# Patient Record
Sex: MALE | Race: WHITE | NOT HISPANIC OR LATINO | Employment: FULL TIME | URBAN - METROPOLITAN AREA
[De-identification: names, ages, dates, MRNs, and addresses within clinical notes are randomized per-mention and may not be internally consistent; named-entity substitution may affect disease eponyms.]

---

## 2024-03-19 ENCOUNTER — OFFICE VISIT (OUTPATIENT)
Dept: ORTHOPEDIC SURGERY | Facility: CLINIC | Age: 69
End: 2024-03-19
Payer: MEDICARE

## 2024-03-19 ENCOUNTER — CLINICAL SUPPORT (OUTPATIENT)
Dept: ORTHOPEDIC SURGERY | Facility: CLINIC | Age: 69
End: 2024-03-19
Payer: MEDICARE

## 2024-03-19 DIAGNOSIS — M54.10 BACK PAIN WITH RADICULOPATHY: ICD-10-CM

## 2024-03-19 DIAGNOSIS — M54.10 BACK PAIN WITH RADICULOPATHY: Primary | ICD-10-CM

## 2024-03-19 PROCEDURE — 1159F MED LIST DOCD IN RCRD: CPT | Performed by: PHYSICIAN ASSISTANT

## 2024-03-19 PROCEDURE — 72100 X-RAY EXAM L-S SPINE 2/3 VWS: CPT | Performed by: ORTHOPAEDIC SURGERY

## 2024-03-19 PROCEDURE — 99204 OFFICE O/P NEW MOD 45 MIN: CPT | Performed by: PHYSICIAN ASSISTANT

## 2024-03-19 PROCEDURE — 1125F AMNT PAIN NOTED PAIN PRSNT: CPT | Performed by: PHYSICIAN ASSISTANT

## 2024-03-19 RX ORDER — TIZANIDINE 4 MG/1
4 TABLET ORAL EVERY 6 HOURS PRN
Qty: 30 TABLET | Refills: 0 | Status: SHIPPED | OUTPATIENT
Start: 2024-03-19 | End: 2024-03-29

## 2024-03-19 RX ORDER — METHYLPREDNISOLONE 4 MG/1
TABLET ORAL
Qty: 1 EACH | Refills: 0 | Status: SHIPPED | OUTPATIENT
Start: 2024-03-19

## 2024-03-19 ASSESSMENT — PAIN SCALES - GENERAL: PAINLEVEL_OUTOF10: 4

## 2024-03-19 ASSESSMENT — PAIN - FUNCTIONAL ASSESSMENT: PAIN_FUNCTIONAL_ASSESSMENT: 0-10

## 2024-03-19 NOTE — PROGRESS NOTES
The patient was new to the practice comes the office complaining of low back pain.  Goes down the right leg to the right hip all the way down to the ankle.  Back of the leg also.  Little bit through the groin started about the fall.  No trauma accident or fall to cause it.  Patient denies bowel or bladder complaints, saddle anesthesia gait or balance changes.  Denies any trauma accident or fall to cause the denies any spine surgeries in the past.  No treatment for this.    We looked at patient recent blood work showing a glucose of 94 sodium 143 potassium 4.5.  We also looked at primary doctors note check the medication list to see if he is on a statin medication to cause back pain and joint leg symptoms.  He has not    Physical exam: Well-nourished, well kept.  No lymphangitis or lymphadenopathy in the examined extremities.  Good perfusion to the extremities ×4.  Radial and dorsalis pedis pulses 2+.  Capillary refill to all 4 digits brisk.  No distal edema x 4.  Gait normal.  Can walk on heels and toes.  Examination of the neck reveals no swelling, step-off, or point tenderness.  Range of motion with flexion, extension, side bending and rotation is well maintained without crepitance, instability, or exacerbation of pain.  Strength is within normal limits.  Decreased range of motion flexion-extension rotation lumbar spine mildly tender good strength no instability.   examination of the upper extremities reveals no point tenderness, swelling, or deformity.  Range of motion of the shoulders, elbows, wrists, and fingers are all full without crepitance, instability, or exacerbation of pain.  Strength is 5/5 throughout.  Examination of the lower extremities reveals no point tenderness, swelling, or deformity.  Range of motion of the hips, knees, and ankles are full without crepitance, instability, or exacerbation of pain.  Strength is 5/5 throughout..  No redness, abrasions, or lesions on all 4 extremities, head and  neck, or trunk.  Gross sensation intact in the extremities ×4.  Deep tendon reflexes 2+ and symmetric bilaterally.  Julia, clonus, and Babinski were negative.  Straight leg raise negative.  Affect normal.  Alert and oriented ×3.  Coordination normal.    X-ray: X-ray lumbar spine AP lateral taken today and reviewed shows arthritic degenerative changes.  Very mild scoliosis patient collapses little bit to the right at the L4-5 level.  No fractures dislocations or bony lytic lesions.    Assessment: This a patient with low back pain radicular pain and encounter wants to treat this conservatively    Plan: Will get him into physical therapy.  At [facility].  Try some muscle relaxants and steroids if he is no better in about 6 weeks or so he can let me know and I will get an MRI of the lumbar spine for diagnostic purposes for maybe injections if needed

## 2024-07-18 ENCOUNTER — HOSPITAL ENCOUNTER (OUTPATIENT)
Dept: RADIOLOGY | Facility: CLINIC | Age: 69
Discharge: HOME | End: 2024-07-18
Payer: MEDICARE

## 2024-07-18 ENCOUNTER — OFFICE VISIT (OUTPATIENT)
Dept: ORTHOPEDIC SURGERY | Facility: CLINIC | Age: 69
End: 2024-07-18
Payer: MEDICARE

## 2024-07-18 DIAGNOSIS — M70.51 PATELLAR BURSITIS OF RIGHT KNEE: Primary | ICD-10-CM

## 2024-07-18 DIAGNOSIS — M25.561 ACUTE PAIN OF RIGHT KNEE: ICD-10-CM

## 2024-07-18 PROCEDURE — 73562 X-RAY EXAM OF KNEE 3: CPT | Mod: RT

## 2024-07-18 PROCEDURE — 99203 OFFICE O/P NEW LOW 30 MIN: CPT | Performed by: INTERNAL MEDICINE

## 2024-07-18 PROCEDURE — 99213 OFFICE O/P EST LOW 20 MIN: CPT | Performed by: INTERNAL MEDICINE

## 2024-07-18 RX ORDER — METHYLPREDNISOLONE 4 MG/1
TABLET ORAL
Qty: 1 EACH | Refills: 0 | Status: SHIPPED | OUTPATIENT
Start: 2024-07-18

## 2024-07-18 NOTE — PROGRESS NOTES
Acute Injury New Patient Visit    CC:   Chief Complaint   Patient presents with    Right Knee - Pain       HPI: Andre is a 68 y.o. male presents today for evaluation for right knee swelling with no pain which started about two days ago. He states that he fell and hit the his right knee into the concrete floor two weeks ago. He is here for initial evaluation and x-rays.  Denies any fevers or chills.        Review of Systems   GENERAL: Negative for malaise, significant weight loss, fever  MUSCULOSKELETAL: See HPI  NEURO:  Negative for numbness / tingling     Past Medical History  No past medical history on file.    Medication review  Medication Documentation Review Audit       Reviewed by Dimas Rogers (Technologist) on 03/19/24 at 0819      Medication Order Taking? Sig Documenting Provider Last Dose Status            No Medications to Display                                   Allergies  No Known Allergies    Social History  Social History     Socioeconomic History    Marital status: Single     Spouse name: Not on file    Number of children: Not on file    Years of education: Not on file    Highest education level: Not on file   Occupational History    Not on file   Tobacco Use    Smoking status: Some Days     Types: Cigars    Smokeless tobacco: Never   Substance and Sexual Activity    Alcohol use: Not on file    Drug use: Not on file    Sexual activity: Not on file   Other Topics Concern    Not on file   Social History Narrative    Not on file     Social Determinants of Health     Financial Resource Strain: Not on file   Food Insecurity: Not on file   Transportation Needs: Not on file   Physical Activity: Not on file   Stress: Not on file   Social Connections: Not on file   Intimate Partner Violence: Not on file   Housing Stability: Not on file       Surgical History  Past Surgical History:   Procedure Laterality Date    OTHER SURGICAL HISTORY  11/18/2019    Colonoscopy    OTHER SURGICAL HISTORY  11/18/2019     Finger surgical procedure       Physical Exam:  GENERAL:  Patient is awake, alert, and oriented to person place and time.  Patient appears well nourished and well kept.  Affect Calm, Not Acutely Distressed.  HEENT:  Normocephalic, Atraumatic, EOMI  CARDIOVASCULAR:  Hemodynamically stable.  RESPIRATORY:  Normal respirations with unlabored breathing.  Extremity: Right knee examination shows prepatellar bursal swelling, with no tenderness to palpate.  There is no erythema or warmth.  No effusion.  Can flex the right knee to 130 degrees.  Full extension at 0 degrees.  No pain over the medial joint line.  No pain over the lateral joint line.  There is no pain over the patellar or quadricep tendon.  No pain over the proximal tibia.  No pain over the popliteal fossa.  Negative valgus stress test.  Negative varus stress test.  Negative Jamaica's test medially with no instability.  Negative Jamaica's test laterally with no instability.  Negative Lachman's test.  Patellar and quadricep mechanism intact.  Negative anterior and posterior drawer test.  Negative patellar apprehension test.  Distal pulses are palpable, neurovascularly intact.  Walking with no significant antalgic gait.      Diagnostics: X-rays reviewed      Procedure: None     Assessment: Right knee prepatellar bursitis    Plan: Andre presents today for initial evaluation for acute right knee injury sustained after he fell abut two weeks ago. X-rays showed no obvious fractures. He sustained a right knee pre-patellar bursitis, with no clinical sign of infection. We recommended a compression sleeve with activity modification and Medrol dose [place] for inflammation. He will follow-up as needed.  Good prognosis.  Reassurance was given today to the patient.    Orders Placed This Encounter    XR knee right 3 views      At the conclusion of the visit there were no further questions by the patient/family regarding their plan of care.  Patient was instructed to call or  return with any issues, questions, or concerns regarding their injury and/or treatment plan described above.     07/18/24 at 2:21 PM - Maty Erwin MD  Scribe Attestation  By signing my name below, I, Finn Herrera, Scribe   attest that this documentation has been prepared under the direction and in the presence of Maty Erwin MD.    Office: [phone]    This note was prepared using voice recognition software.  The details of this note are correct and have been reviewed, and corrected to the best of my ability.  Some grammatical errors may persist related to the Dragon software.

## 2025-01-15 ENCOUNTER — OFFICE VISIT (OUTPATIENT)
Dept: ORTHOPEDIC SURGERY | Facility: CLINIC | Age: 70
End: 2025-01-15
Payer: MEDICARE

## 2025-01-15 ENCOUNTER — HOSPITAL ENCOUNTER (OUTPATIENT)
Dept: RADIOLOGY | Facility: CLINIC | Age: 70
Discharge: HOME | End: 2025-01-15
Payer: MEDICARE

## 2025-01-15 DIAGNOSIS — M25.571 ACUTE RIGHT ANKLE PAIN: ICD-10-CM

## 2025-01-15 DIAGNOSIS — M19.071 ARTHRITIS OF RIGHT ANKLE: ICD-10-CM

## 2025-01-15 PROCEDURE — 99214 OFFICE O/P EST MOD 30 MIN: CPT | Performed by: INTERNAL MEDICINE

## 2025-01-15 PROCEDURE — 99213 OFFICE O/P EST LOW 20 MIN: CPT | Performed by: INTERNAL MEDICINE

## 2025-01-15 PROCEDURE — L4361 PNEUMA/VAC WALK BOOT PRE OTS: HCPCS | Performed by: INTERNAL MEDICINE

## 2025-01-15 PROCEDURE — 73610 X-RAY EXAM OF ANKLE: CPT | Mod: RT

## 2025-01-15 PROCEDURE — 73630 X-RAY EXAM OF FOOT: CPT | Mod: RT

## 2025-01-15 RX ORDER — ACETAMINOPHEN AND CODEINE PHOSPHATE 300; 30 MG/1; MG/1
1 TABLET ORAL EVERY 6 HOURS PRN
Qty: 20 TABLET | Refills: 0 | Status: SHIPPED | OUTPATIENT
Start: 2025-01-15 | End: 2025-01-22

## 2025-01-15 RX ORDER — PREDNISONE 50 MG/1
50 TABLET ORAL DAILY
Qty: 10 TABLET | Refills: 0 | Status: SHIPPED | OUTPATIENT
Start: 2025-01-15 | End: 2025-01-25

## 2025-01-15 NOTE — PROGRESS NOTES
Acute Injury New Patient Visit    CC:   Chief Complaint   Patient presents with    Right Ankle - Pain       HPI: Andre is a 69 y.o. male presents today for evaluation for acute right ankle pain which started four days ago. No trauma or fall. He notes worsening right ankle pain. He denies any history of gout or previous injury. He is here for initial evaluation and x-rays.         Review of Systems   GENERAL: Negative for malaise, significant weight loss, fever  MUSCULOSKELETAL: See HPI  NEURO:  Negative for numbness / tingling     Past Medical History  No past medical history on file.    Medication review  Medication Documentation Review Audit       Reviewed by Dimas Rogers (Technologist) on 03/19/24 at 0819      Medication Order Taking? Sig Documenting Provider Last Dose Status            No Medications to Display                                   Allergies  No Known Allergies    Social History  Social History     Socioeconomic History    Marital status: Single     Spouse name: Not on file    Number of children: Not on file    Years of education: Not on file    Highest education level: Not on file   Occupational History    Not on file   Tobacco Use    Smoking status: Some Days     Types: Cigars    Smokeless tobacco: Never   Substance and Sexual Activity    Alcohol use: Not on file    Drug use: Not on file    Sexual activity: Not on file   Other Topics Concern    Not on file   Social History Narrative    Not on file     Social Drivers of Health     Financial Resource Strain: Not on file   Food Insecurity: Not on file   Transportation Needs: Not on file   Physical Activity: Not on file   Stress: Not on file   Social Connections: Not on file   Intimate Partner Violence: Not on file   Housing Stability: Not on file       Surgical History  Past Surgical History:   Procedure Laterality Date    OTHER SURGICAL HISTORY  11/18/2019    Colonoscopy    OTHER SURGICAL HISTORY  11/18/2019    Finger surgical procedure        Physical Exam:  GENERAL:  Patient is awake, alert, and oriented to person place and time.  Patient appears well nourished and well kept.  Affect Calm, Not Acutely Distressed.  HEENT:  Normocephalic, Atraumatic, EOMI  CARDIOVASCULAR:  Hemodynamically stable.  RESPIRATORY:  Normal respirations with unlabored breathing.  Extremity: Right ankle shows skin is intact.  Swelling localized over the right ankle.  There is no erythema or warmth.  There is no clinical signs of infection.  Pain over the tibiotalar joint.  There is no pain over the lateral malleolus.  There is no pain of the medial malleolus.  There is no pain over the ATF, CF or PTF ligament.  There is no pain over the deltoid ligament.  No pain over the Achilles tendon.  Negative Hagen's test.  Negative squeeze test.  Negative anterior drawer test.  Negative talar tilt test.  No pain over the anterior process of the talus.  There is no pain over the talar dome.  There is no pain at the base of the fifth metatarsal bone.  No pain of the calcaneus.  No pain over the plantar aponeurosis.  No pain of the midfoot.  Neurovascularly intact.    Right foot shows skin is intact.  No erythema warmth.  Mild pain over the first MTP joint.  Mild pain in the midfoot.  No pain at the base of the fifth metatarsal bone.  No pain of the calcaneus.  Distal pulses are palpable.    Diagnostics: X-rays reviewed  XR knee right 3 views  Interpreted By:  Maty Garcia,   STUDY:  XR KNEE RIGHT 3 VIEWS, 7/18/2024 2:20 pm      INDICATION:  Signs/Symptoms:PAIN      ACCESSION NUMBER(S):  [id number]      ORDERING CLINICIAN:  MATY GARCIA      FINDINGS:  Right knee x-rays three views AP, lateral and sunrise view: No acute  fractures, no dislocation. Mild degenerative changes. Slight  chondrocalcinosis of the medial and lateral compartment.          Signed by: Maty Garcia 7/18/2024 7:36 PM  Dictation workstation:   [id number]      Procedure: None    Assessment: Right ankle  arthrosis    Plan: Andre presents today for initial evaluation for acute right ankle pain which started four days ago secondary to aggravation of his right ankle arthrosis. No trauma or fall.  We recommended short course oral prednisone 50 mg for 5 days and a fracture boot for support, weight-bear as tolerated. He will follow-up in 2 weeks for reevaluation. If no improvement, we may consider further advanced imaging or possible corticosteroid injection.     Orders Placed This Encounter    XR ankle right 3+ views      At the conclusion of the visit there were no further questions by the patient/family regarding their plan of care.  Patient was instructed to call or return with any issues, questions, or concerns regarding their injury and/or treatment plan described above.     01/15/25 at 11:43 AM - Maty Erwin MD  Scribe Attestation  By signing my name below, I, Finn Herrera, Scribe   attest that this documentation has been prepared under the direction and in the presence of Maty Erwin MD.    Office: [phone]    This note was prepared using voice recognition software.  The details of this note are correct and have been reviewed, and corrected to the best of my ability.  Some grammatical errors may persist related to the Dragon software.

## 2025-01-30 ENCOUNTER — OFFICE VISIT (OUTPATIENT)
Dept: ORTHOPEDIC SURGERY | Facility: CLINIC | Age: 70
End: 2025-01-30
Payer: MEDICARE

## 2025-01-30 DIAGNOSIS — M19.071 ARTHRITIS OF RIGHT ANKLE: Primary | ICD-10-CM

## 2025-01-30 PROCEDURE — 1159F MED LIST DOCD IN RCRD: CPT | Performed by: INTERNAL MEDICINE

## 2025-01-30 PROCEDURE — 99214 OFFICE O/P EST MOD 30 MIN: CPT | Performed by: INTERNAL MEDICINE

## 2025-01-30 PROCEDURE — 99213 OFFICE O/P EST LOW 20 MIN: CPT | Performed by: INTERNAL MEDICINE

## 2025-01-30 RX ORDER — PREDNISONE 50 MG/1
50 TABLET ORAL DAILY
Qty: 5 TABLET | Refills: 0 | Status: SHIPPED | OUTPATIENT
Start: 2025-01-30 | End: 2025-02-04

## 2025-01-30 NOTE — PROGRESS NOTES
CC:   Chief Complaint   Patient presents with    Right Ankle - Follow-up     Arthrosis  Given prednisone       HPI: Andre is a 69 y.o. male presents today for reevaluation for right ankle arthrosis. He states that he had significant elief from the fracture boot and oral steroids, however he did not rest while on the medication and did a lot of working and walking on the ice.. He states that he is about 70% better.         Review of Systems   GENERAL: Negative for malaise, significant weight loss, fever  MUSCULOSKELETAL: See HPI  NEURO:  Negative for numbness / tingling     Past Medical History  History reviewed. No pertinent past medical history.    Medication review  Medication Documentation Review Audit       Reviewed by Rima Reese MA (Medical Assistant) on 25 at 0936      Medication Order Taking? Sig Documenting Provider Last Dose Status   methylPREDNISolone (Medrol Dospak) 4 mg tablets [id number]  Follow schedule on package instructions Jesse Frausto MD  Active   methylPREDNISolone (Medrol Dospak) 4 mg tablets [id number]  Follow schedule on package instructions Maty Erwin MD  Active   predniSONE (Deltasone) 50 mg tablet [id number]  Take 1 tablet (50 mg) by mouth once daily for 10 days. Maty Erwin MD   25 2359   tiZANidine (Zanaflex) 4 mg tablet [id number]  Take 1 tablet (4 mg) by mouth every 6 hours if needed for muscle spasms for up to 10 days. Jesse Frausto MD   24 2359                    Allergies  No Known Allergies    Social History  Social History     Socioeconomic History    Marital status: Single     Spouse name: Not on file    Number of children: Not on file    Years of education: Not on file    Highest education level: Not on file   Occupational History    Not on file   Tobacco Use    Smoking status: Some Days     Types: Cigars    Smokeless tobacco: Never   Substance and Sexual Activity    Alcohol use: Not on file    Drug use: Not on file     Sexual activity: Not on file   Other Topics Concern    Not on file   Social History Narrative    Not on file     Social Drivers of Health     Financial Resource Strain: Not on file   Food Insecurity: Not on file   Transportation Needs: Not on file   Physical Activity: Not on file   Stress: Not on file   Social Connections: Not on file   Intimate Partner Violence: Not on file   Housing Stability: Not on file       Surgical History  Past Surgical History:   Procedure Laterality Date    OTHER SURGICAL HISTORY  11/18/2019    Colonoscopy    OTHER SURGICAL HISTORY  11/18/2019    Finger surgical procedure       Physical Exam:  GENERAL:  Patient is awake, alert, and oriented to person place and time.  Patient appears well nourished and well kept.  Affect Calm, Not Acutely Distressed.  HEENT:  Normocephalic, Atraumatic, EOMI  CARDIOVASCULAR:  Hemodynamically stable.  RESPIRATORY:  Normal respirations with unlabored breathing.  Extremity: Right ankle shows skin is intact.  Resolved swelling localized over the right ankle.  There is no erythema or warmth.  There is no clinical signs of infection.  Minimal pain over the tibiotalar joint.  There is no pain over the lateral malleolus.  Pain over the peroneal tendon posterior to the lateral malleolus along its insertion.  There is no pain of the medial malleolus.  There is no pain over the ATF, CF or PTF ligament.  There is no pain over the deltoid ligament.  No pain over the Achilles tendon.  Negative Hagen's test.  Negative squeeze test.  Negative anterior drawer test.  Negative talar tilt test.  No pain over the anterior process of the talus.  There is no pain over the talar dome.  There is no pain at the base of the fifth metatarsal bone.  No pain of the calcaneus.  No pain over the plantar aponeurosis.  No pain of the midfoot.  Neurovascularly intact.       Diagnostics: None today  XR ankle right 3+ views  Interpreted By:  Maty Erwin,   STUDY:  XR ANKLE RIGHT 3+  VIEWS, 1/15/2025 11:54 am      INDICATION:  Signs/Symptoms:pain      ACCESSION NUMBER(S):  [id number]      ORDERING CLINICIAN:  MATY GARCIA      FINDINGS:  Right ankle x-rays three views AP, lateral and oblique view: No acute  fractures, no dislocation. Mortise intact. Moderate degenerative  changes of the tibiotalar joint.          Signed by: Maty Garcia 1/15/2025 5:33 PM  Dictation workstation:   [id number]  XR foot right 3+ views  Interpreted By:  Maty Garcia,   STUDY:  XR FOOT RIGHT 3+ VIEWS, 1/15/2025 11:54 am      INDICATION:  Signs/Symptoms:pain      ACCESSION NUMBER(S):  [id number]      ORDERING CLINICIAN:  MATY GARCIA      FINDINGS:  Right foot x-rays three views AP, lateral and oblique view: No acute  fractures, no dislocation. Advanced degenerative changes of the 1st  MTP joint. Degenerative changes of the talonavicular joint.          Signed by: Maty Garcia 1/15/2025 5:33 PM  Dictation workstation:   [id number]        Procedure: None    Assessment:  1.  Right ankle arthrosis  2.  Right peroneal tendinitis      Plan: Andre presents today for reevaluation for right ankle arthrosis and peroneal tendinitis. He is clinically getting better, some pain, from overuse. We recommended a lace up ankle brace, rest, physical therapy, and a short course of oral prednisone 50 mg for 5 days. He will follow-up in 4-5 weeks for reevaluation.  If no improvement we may consider further advanced imaging or custom orthotics.    No orders of the defined types were placed in this encounter.     At the conclusion of the visit there were no further questions by the patient/family regarding their plan of care.  Patient was instructed to call or return with any issues, questions, or concerns regarding their injury and/or treatment plan described above.     01/30/25 at 9:36 AM - Maty Garcia MD  Scribe Attestation  By signing my name below, IFinn Scribe   attest that this documentation has  been prepared under the direction and in the presence of Maty Erwin MD.    Office: [phone]    This note was prepared using voice recognition software.  The details of this note are correct and have been reviewed, and corrected to the best of my ability.  Some grammatical errors may persist related to the Dragon software.

## 2025-02-13 ENCOUNTER — OFFICE VISIT (OUTPATIENT)
Dept: ORTHOPEDIC SURGERY | Facility: CLINIC | Age: 70
End: 2025-02-13
Payer: MEDICARE

## 2025-02-13 ENCOUNTER — HOSPITAL ENCOUNTER (OUTPATIENT)
Dept: RADIOLOGY | Facility: CLINIC | Age: 70
Discharge: HOME | End: 2025-02-13
Payer: MEDICARE

## 2025-02-13 DIAGNOSIS — M19.071 ARTHROSIS OF ANKLE, RIGHT: Primary | ICD-10-CM

## 2025-02-13 DIAGNOSIS — M19.071 ARTHRITIS OF RIGHT ANKLE: ICD-10-CM

## 2025-02-13 PROCEDURE — 1159F MED LIST DOCD IN RCRD: CPT | Performed by: INTERNAL MEDICINE

## 2025-02-13 PROCEDURE — 73610 X-RAY EXAM OF ANKLE: CPT | Mod: RT

## 2025-02-13 PROCEDURE — 99213 OFFICE O/P EST LOW 20 MIN: CPT | Performed by: INTERNAL MEDICINE

## 2025-02-13 RX ORDER — ACETAMINOPHEN AND CODEINE PHOSPHATE 300; 30 MG/1; MG/1
1 TABLET ORAL EVERY 12 HOURS PRN
Qty: 14 TABLET | Refills: 0 | Status: SHIPPED | OUTPATIENT
Start: 2025-02-13 | End: 2025-02-20

## 2025-02-13 NOTE — PROGRESS NOTES
CC:   No chief complaint on file.      HPI: Andre is a 69 y.o. male presents today for reevaluation for right ankle arthrosis. He states that he has increased pain and swelling, he notes that he had been doing well, about 80% better after his initial oral steroids, until this recent flare up. He was evaluated at the urgent care today and was placed on oral steroids again. Repeat x-rays today.  Denies any fevers or chills.  No recent infection.        Review of Systems   GENERAL: Negative for malaise, significant weight loss, fever  MUSCULOSKELETAL: See HPI  NEURO:  Negative for numbness / tingling     Past Medical History  No past medical history on file.    Medication review  Medication Documentation Review Audit       Reviewed by Rima Reese MA (Medical Assistant) on 25 at 0936      Medication Order Taking? Sig Documenting Provider Last Dose Status   methylPREDNISolone (Medrol Dospak) 4 mg tablets [id number]  Follow schedule on package instructions Jesse Frausto MD  Active   methylPREDNISolone (Medrol Dospak) 4 mg tablets [id number]  Follow schedule on package instructions Maty Erwin MD  Active   predniSONE (Deltasone) 50 mg tablet [id number]  Take 1 tablet (50 mg) by mouth once daily for 10 days. Maty Erwin MD   25 2359   tiZANidine (Zanaflex) 4 mg tablet [id number]  Take 1 tablet (4 mg) by mouth every 6 hours if needed for muscle spasms for up to 10 days. Jesse Frausto MD   24 2359                    Allergies  No Known Allergies    Social History  Social History     Socioeconomic History    Marital status: Single     Spouse name: Not on file    Number of children: Not on file    Years of education: Not on file    Highest education level: Not on file   Occupational History    Not on file   Tobacco Use    Smoking status: Some Days     Types: Cigars    Smokeless tobacco: Never   Substance and Sexual Activity    Alcohol use: Not on file    Drug use: Not on  file    Sexual activity: Not on file   Other Topics Concern    Not on file   Social History Narrative    Not on file     Social Drivers of Health     Financial Resource Strain: Not on file   Food Insecurity: Not on file   Transportation Needs: Not on file   Physical Activity: Not on file   Stress: Not on file   Social Connections: Not on file   Intimate Partner Violence: Not on file   Housing Stability: Not on file       Surgical History  Past Surgical History:   Procedure Laterality Date    OTHER SURGICAL HISTORY  11/18/2019    Colonoscopy    OTHER SURGICAL HISTORY  11/18/2019    Finger surgical procedure       Physical Exam:  GENERAL:  Patient is awake, alert, and oriented to person place and time.  Patient appears well nourished and well kept.  Affect Calm, Not Acutely Distressed.  HEENT:  Normocephalic, Atraumatic, EOMI  CARDIOVASCULAR:  Hemodynamically stable.  RESPIRATORY:  Normal respirations with unlabored breathing.  Extremity: Right ankle shows skin is intact.  Mild swelling localized over the right ankle.  There is no erythema or warmth.  There is no clinical signs of infection.  Minimal pain over the tibiotalar joint.  There is no pain over the lateral malleolus.  No pain over the peroneal tendon posterior to the lateral malleolus along its insertion.  There is no pain of the medial malleolus.  There is no pain over the ATF, CF or PTF ligament.  There is no pain over the deltoid ligament.  No pain over the Achilles tendon.  Negative Hagen's test.  Negative squeeze test.  Negative anterior drawer test.  Negative talar tilt test.  No pain over the anterior process of the talus.  There is no pain over the talar dome.  There is no pain at the base of the fifth metatarsal bone.  No pain of the calcaneus.  No pain over the plantar aponeurosis.  No pain of the midfoot.  Neurovascularly intact.       Diagnostics: None today  XR ankle right 3+ views  Interpreted By:  Maty Erwin,   STUDY:  XR ANKLE RIGHT  3+ VIEWS, 1/15/2025 11:54 am      INDICATION:  Signs/Symptoms:pain      ACCESSION NUMBER(S):  [id number]      ORDERING CLINICIAN:  MATY GARCIA      FINDINGS:  Right ankle x-rays three views AP, lateral and oblique view: No acute  fractures, no dislocation. Mortise intact. Moderate degenerative  changes of the tibiotalar joint.          Signed by: Maty Garcia 1/15/2025 5:33 PM  Dictation workstation:   [id number]  XR foot right 3+ views  Interpreted By:  Maty Garcia,   STUDY:  XR FOOT RIGHT 3+ VIEWS, 1/15/2025 11:54 am      INDICATION:  Signs/Symptoms:pain      ACCESSION NUMBER(S):  [id number]      ORDERING CLINICIAN:  MATY GARCIA      FINDINGS:  Right foot x-rays three views AP, lateral and oblique view: No acute  fractures, no dislocation. Advanced degenerative changes of the 1st  MTP joint. Degenerative changes of the talonavicular joint.          Signed by: Maty Garcia 1/15/2025 5:33 PM  Dictation workstation:   [id number]        Procedure: None    Assessment: Right ankle arthrosis      Plan: Andre presents today for reevaluation for right ankle arthrosis and peroneal tendinitis. He is symptomatic.  Initially responded to the oral steroids several weeks ago.  His pain and swelling have recurred.  There are no clinical signs infection.  No lower extremity pitting edema.  Repeat x-ray shows no obvious fractures or stress fractures.  Similar-appearing degenerative changes.  We did recommend MRI of the right ankle, to evaluate for ankle arthrosis with persistent pain.  He will continue with his oral steroids and fracture boot.  He will follow-up with Ortho foot and ankle after the MRI to discuss further treatment options.      No orders of the defined types were placed in this encounter.     At the conclusion of the visit there were no further questions by the patient/family regarding their plan of care.  Patient was instructed to call or return with any issues, questions, or concerns  regarding their injury and/or treatment plan described above.     02/13/25 at 12:43 PM - Maty Erwin MD  Scribe Attestation  By signing my name below, I, Finn Herrera, Scribe   attest that this documentation has been prepared under the direction and in the presence of Maty Erwin MD.    Office: [phone]    This note was prepared using voice recognition software.  The details of this note are correct and have been reviewed, and corrected to the best of my ability.  Some grammatical errors may persist related to the Dragon software.

## 2025-03-04 ENCOUNTER — HOSPITAL ENCOUNTER (EMERGENCY)
Facility: HOSPITAL | Age: 70
Discharge: HOME | End: 2025-03-04
Payer: MEDICARE

## 2025-03-04 VITALS
DIASTOLIC BLOOD PRESSURE: 75 MMHG | RESPIRATION RATE: 16 BRPM | SYSTOLIC BLOOD PRESSURE: 158 MMHG | WEIGHT: 208 LBS | TEMPERATURE: 98.4 F | HEIGHT: 74 IN | HEART RATE: 73 BPM | BODY MASS INDEX: 26.69 KG/M2 | OXYGEN SATURATION: 100 %

## 2025-03-04 DIAGNOSIS — I80.01 THROMBOPHLEBITIS OF RIGHT SAPHENOUS VEIN: Primary | ICD-10-CM

## 2025-03-04 LAB
ALBUMIN SERPL BCP-MCNC: 3.8 G/DL (ref 3.4–5)
ALP SERPL-CCNC: 65 U/L (ref 33–136)
ALT SERPL W P-5'-P-CCNC: 7 U/L (ref 10–52)
ANION GAP SERPL CALC-SCNC: 12 MMOL/L (ref 10–20)
AST SERPL W P-5'-P-CCNC: 10 U/L (ref 9–39)
BASOPHILS # BLD AUTO: 0.04 X10*3/UL (ref 0–0.1)
BASOPHILS NFR BLD AUTO: 0.5 %
BILIRUB SERPL-MCNC: 0.7 MG/DL (ref 0–1.2)
BNP SERPL-MCNC: 96 PG/ML (ref 0–99)
BUN SERPL-MCNC: 18 MG/DL (ref 6–23)
CALCIUM SERPL-MCNC: 9.4 MG/DL (ref 8.6–10.3)
CHLORIDE SERPL-SCNC: 106 MMOL/L (ref 98–107)
CO2 SERPL-SCNC: 24 MMOL/L (ref 21–32)
CREAT SERPL-MCNC: 0.86 MG/DL (ref 0.5–1.3)
EGFRCR SERPLBLD CKD-EPI 2021: >90 ML/MIN/1.73M*2
EOSINOPHIL # BLD AUTO: 0.15 X10*3/UL (ref 0–0.7)
EOSINOPHIL NFR BLD AUTO: 1.9 %
ERYTHROCYTE [DISTWIDTH] IN BLOOD BY AUTOMATED COUNT: 13.2 % (ref 11.5–14.5)
GLUCOSE SERPL-MCNC: 102 MG/DL (ref 74–99)
HCT VFR BLD AUTO: 29 % (ref 41–52)
HGB BLD-MCNC: 10.8 G/DL (ref 13.5–17.5)
IMM GRANULOCYTES # BLD AUTO: 0.02 X10*3/UL (ref 0–0.7)
IMM GRANULOCYTES NFR BLD AUTO: 0.3 % (ref 0–0.9)
INR PPP: 1.1 (ref 0.9–1.1)
LYMPHOCYTES # BLD AUTO: 1.77 X10*3/UL (ref 1.2–4.8)
LYMPHOCYTES NFR BLD AUTO: 22.3 %
MCH RBC QN AUTO: 32.7 PG (ref 26–34)
MCHC RBC AUTO-ENTMCNC: 37.2 G/DL (ref 32–36)
MCV RBC AUTO: 88 FL (ref 80–100)
MONOCYTES # BLD AUTO: 0.53 X10*3/UL (ref 0.1–1)
MONOCYTES NFR BLD AUTO: 6.7 %
NEUTROPHILS # BLD AUTO: 5.44 X10*3/UL (ref 1.2–7.7)
NEUTROPHILS NFR BLD AUTO: 68.3 %
NRBC BLD-RTO: 0 /100 WBCS (ref 0–0)
PLATELET # BLD AUTO: 259 X10*3/UL (ref 150–450)
POTASSIUM SERPL-SCNC: 4 MMOL/L (ref 3.5–5.3)
PROT SERPL-MCNC: 7 G/DL (ref 6.4–8.2)
PROTHROMBIN TIME: 11.9 SECONDS (ref 9.8–12.4)
RBC # BLD AUTO: 3.3 X10*6/UL (ref 4.5–5.9)
SODIUM SERPL-SCNC: 138 MMOL/L (ref 136–145)
URATE SERPL-MCNC: 6 MG/DL (ref 4–7.5)
WBC # BLD AUTO: 8 X10*3/UL (ref 4.4–11.3)

## 2025-03-04 PROCEDURE — 2500000001 HC RX 250 WO HCPCS SELF ADMINISTERED DRUGS (ALT 637 FOR MEDICARE OP): Performed by: PHYSICIAN ASSISTANT

## 2025-03-04 PROCEDURE — 80053 COMPREHEN METABOLIC PANEL: CPT | Performed by: PHYSICIAN ASSISTANT

## 2025-03-04 PROCEDURE — 36415 COLL VENOUS BLD VENIPUNCTURE: CPT | Performed by: PHYSICIAN ASSISTANT

## 2025-03-04 PROCEDURE — 99283 EMERGENCY DEPT VISIT LOW MDM: CPT

## 2025-03-04 PROCEDURE — 85025 COMPLETE CBC W/AUTO DIFF WBC: CPT | Performed by: PHYSICIAN ASSISTANT

## 2025-03-04 PROCEDURE — 83880 ASSAY OF NATRIURETIC PEPTIDE: CPT | Performed by: PHYSICIAN ASSISTANT

## 2025-03-04 PROCEDURE — 84550 ASSAY OF BLOOD/URIC ACID: CPT | Performed by: PHYSICIAN ASSISTANT

## 2025-03-04 PROCEDURE — 85610 PROTHROMBIN TIME: CPT | Performed by: PHYSICIAN ASSISTANT

## 2025-03-04 RX ORDER — OXYCODONE AND ACETAMINOPHEN 5; 325 MG/1; MG/1
1 TABLET ORAL ONCE
Status: COMPLETED | OUTPATIENT
Start: 2025-03-04 | End: 2025-03-04

## 2025-03-04 RX ADMIN — OXYCODONE HYDROCHLORIDE AND ACETAMINOPHEN 1 TABLET: 5; 325 TABLET ORAL at 17:37

## 2025-03-04 ASSESSMENT — PAIN - FUNCTIONAL ASSESSMENT: PAIN_FUNCTIONAL_ASSESSMENT: 0-10

## 2025-03-04 ASSESSMENT — PAIN DESCRIPTION - PAIN TYPE: TYPE: ACUTE PAIN

## 2025-03-04 ASSESSMENT — PAIN SCALES - GENERAL
PAINLEVEL_OUTOF10: 0 - NO PAIN
PAINLEVEL_OUTOF10: 9
PAINLEVEL_OUTOF10: 9

## 2025-03-04 ASSESSMENT — PAIN SCALES - PAIN ASSESSMENT IN ADVANCED DEMENTIA (PAINAD): TOTALSCORE: MEDICATION (SEE MAR)

## 2025-03-04 ASSESSMENT — LIFESTYLE VARIABLES
HAVE YOU EVER FELT YOU SHOULD CUT DOWN ON YOUR DRINKING: NO
HAVE PEOPLE ANNOYED YOU BY CRITICIZING YOUR DRINKING: NO
TOTAL SCORE: 0
EVER HAD A DRINK FIRST THING IN THE MORNING TO STEADY YOUR NERVES TO GET RID OF A HANGOVER: NO
EVER FELT BAD OR GUILTY ABOUT YOUR DRINKING: NO

## 2025-03-04 ASSESSMENT — PAIN DESCRIPTION - FREQUENCY: FREQUENCY: CONSTANT/CONTINUOUS

## 2025-03-04 ASSESSMENT — COLUMBIA-SUICIDE SEVERITY RATING SCALE - C-SSRS
2. HAVE YOU ACTUALLY HAD ANY THOUGHTS OF KILLING YOURSELF?: NO
6. HAVE YOU EVER DONE ANYTHING, STARTED TO DO ANYTHING, OR PREPARED TO DO ANYTHING TO END YOUR LIFE?: NO
1. IN THE PAST MONTH, HAVE YOU WISHED YOU WERE DEAD OR WISHED YOU COULD GO TO SLEEP AND NOT WAKE UP?: NO

## 2025-03-04 ASSESSMENT — PAIN DESCRIPTION - LOCATION
LOCATION: LEG
LOCATION: ANKLE

## 2025-03-04 ASSESSMENT — PAIN DESCRIPTION - ORIENTATION
ORIENTATION: LEFT
ORIENTATION: RIGHT

## 2025-03-04 ASSESSMENT — PAIN DESCRIPTION - PROGRESSION: CLINICAL_PROGRESSION: NOT CHANGED

## 2025-03-04 ASSESSMENT — PAIN DESCRIPTION - DESCRIPTORS: DESCRIPTORS: ACHING

## 2025-03-04 ASSESSMENT — PAIN DESCRIPTION - ONSET: ONSET: SUDDEN

## 2025-03-04 NOTE — ED PROVIDER NOTES
HPI   Chief Complaint   Patient presents with    Leg Pain     Right lower leg pain.  States  States that he has a blood clot in his right leg.       69-year-old male, otherwise healthy presenting to the ER today with pain in his right foot, swelling in his right leg.  Patient tells me there was no fall or injury and he does work in construction.  He states the pain in his foot started on 1/13.  He states initially he thought it was just arthritis in the foot but then he had some swelling and pain around his big toe joint and he went to an urgent care because he thought he had gout.  He states that they took x-rays and they gave him steroids and a walking boot and discharged him home.  He was wearing the walking boot and states his symptoms improved with the steroids but then when he ran out of the steroids the pain came right back.  He then started to notice some swelling in the right ankle and over the past 3 to 4 days the swelling seemed to get worse.  He went to an urgent care today where they did an ultrasound of his leg and told him he had a blood clot and needed to come to the hospital.  Patient tells me he has never had history of DVT or PE and denies recent travel or surgery or immobilization.  Patient states that he was wearing the walking boot initially but has been out of it for the past few weeks.  He does not have history of gout but believes the pain in his foot is from gout.  He has not seen his PCP.  Patient denies any pain up towards his knee or thigh.  He is not having any numbness or weakness or paresthesias.  He denies fever, cough, chest pain or shortness of breath.  He does not use any alcohol or IV drugs.  No further complaints this time.      History provided by:  Patient          Patient History   History reviewed. No pertinent past medical history.  Past Surgical History:   Procedure Laterality Date    OTHER SURGICAL HISTORY  11/18/2019    Colonoscopy    OTHER SURGICAL HISTORY   11/18/2019    Finger surgical procedure     No family history on file.  Social History     Tobacco Use    Smoking status: Some Days     Types: Cigars    Smokeless tobacco: Never   Substance Use Topics    Alcohol use: Not on file    Drug use: Not on file       Physical Exam   ED Triage Vitals [03/04/25 1607]   Temperature Heart Rate Respirations BP   36.9 °C (98.4 °F) 74 20 (!) 195/87      Pulse Ox Temp Source Heart Rate Source Patient Position   98 % Temporal Monitor Sitting      BP Location FiO2 (%)     Right arm --       Physical Exam  Constitutional:       General: He is not in acute distress.  Eyes:      Conjunctiva/sclera: Conjunctivae normal.   Cardiovascular:      Rate and Rhythm: Normal rate and regular rhythm.      Pulses: Normal pulses.      Heart sounds: Normal heart sounds.      Comments: DP/PT 2+, cap refill less than 2 seconds.  Pulmonary:      Effort: Pulmonary effort is normal.      Breath sounds: Normal breath sounds.   Musculoskeletal:      Comments: Tenderness over right foot 1st MTP joint with mild swelling. No erythema, ecchymosis or crepitus. No further tenderness to the right foot and no obvious bony deformity or dislocation. Tenderness with edema circumferentially about the right ankle. No erythema, crepitus or ecchymosis. Negative thompsons test. Mild tenderness in right calf on palpation without further edema. ROM intact to RLE. NVI   Skin:     General: Skin is warm.      Capillary Refill: Capillary refill takes less than 2 seconds.   Neurological:      Mental Status: He is alert.      Comments: Speech normal           ED Course & MDM   Diagnoses as of 03/04/25 1855   Thrombophlebitis of right saphenous vein                 No data recorded     Raquel Coma Scale Score: 15 (03/04/25 1707 : Ellen Callahan RN)                           Medical Decision Making  69 year old male, otherwise healthy presenting to the ED today with continued pain in the right foot, swelling in the right ankle  and blood clot on ultrasound at urgent care.  Patient initially in the middle of January had pain and swelling in his foot that he thought was gout even though he has no history of gout.  He was seen by orthopedics where x-rays were obtained and he was given a walking boot and steroids.  His symptoms did transiently get better but then after the steroids were over the pain returned.  He was started on another course of steroids but now he has some increasing swelling over the past 3 to 4 days in his right ankle and leg.  He does have some pain going up into his calf.  He went to an urgent care they did an ultrasound and told him he had a blood clot needed to come to the ER.  Patient does not have history of DVT or PE, no recent travel or surgery or immobilization.  He states however he was wearing the walking boot that they told him to rest and immobilize his calf briefly but he has been out of this for the past few weeks.  He denies chest pain or shortness of breath, cough or hemoptysis and has no pain to deep inspiration.  He otherwise has been feeling well.  Patient arrives afebrile, hypertensive with otherwise stable vital signs.  On my exam heart RRR, lungs are clear.  He does have edema about the right ankle circumferential really and some tenderness in the right calf.  There is no further edema in the right leg and no further tenderness, no erythema.  He is perfusing well good distal pulses.  He is also tender about the right foot first MTP joint.  He is otherwise neurovascularly intact and range of motion is intact.  Laboratory studies are ordered.    I did review ultrasound performed prior to arrival at [facility] which does show thrombosis of the small saphenous vein of the right lower extremity. Positive study for superficial thrombophlebitis in the imaged segments of   the right lower extremity, manifested by thrombosis of the small   saphenous vein.     Negative study for proximal DVT in the right  lower extremity.     Negative study for calf DVT in the right lower extremity.       Laboratory studies will be obtained at this time.    CBC with hemoglobin of 10.8, no leukocytosis.  Renal function is stable, uric acid is 6 and labs are otherwise within normal limits.  On repeat assessment patient is resting comfortably.  He is still denying any chest pain or shortness of breath.  He does have a superficial thrombophlebitis however due to the extent of the clot we will place him on prophylactic or anticoagulation and patient was agreeable with this plan.  We will get the patient scheduled to see his primary care doctor as well.  Patient denies any history of bleeding disorders, GI bleeding.  I did discuss with him pain control measures to take at home and I will discharge him with oral anticoagulants.  I did give him strict return precautions and he expressed understanding and agreed with the plan of care today.      Labs Reviewed   CBC WITH AUTO DIFFERENTIAL - Abnormal       Result Value    WBC 8.0      nRBC 0.0      RBC 3.30 (*)     Hemoglobin 10.8 (*)     Hematocrit 29.0 (*)     MCV 88      MCH 32.7      MCHC 37.2 (*)     RDW 13.2      Platelets 259      Neutrophils % 68.3      Immature Granulocytes %, Automated 0.3      Lymphocytes % 22.3      Monocytes % 6.7      Eosinophils % 1.9      Basophils % 0.5      Neutrophils Absolute 5.44      Immature Granulocytes Absolute, Automated 0.02      Lymphocytes Absolute 1.77      Monocytes Absolute 0.53      Eosinophils Absolute 0.15      Basophils Absolute 0.04     COMPREHENSIVE METABOLIC PANEL - Abnormal    Glucose 102 (*)     Sodium 138      Potassium 4.0      Chloride 106      Bicarbonate 24      Anion Gap 12      Urea Nitrogen 18      Creatinine 0.86      eGFR >90      Calcium 9.4      Albumin 3.8      Alkaline Phosphatase 65      Total Protein 7.0      AST 10      Bilirubin, Total 0.7      ALT 7 (*)    PROTIME-INR - Normal    Protime 11.9      INR 1.1     URIC ACID  - Normal    Uric Acid 6.0     B-TYPE NATRIURETIC PEPTIDE - Normal    BNP 96      Narrative:        <100 pg/mL - Heart failure unlikely  100-299 pg/mL - Intermediate probability of acute heart                  failure exacerbation. Correlate with clinical                  context and patient history.    >=300 pg/mL - Heart Failure likely. Correlate with clinical                  context and patient history.    BNP testing is performed using different testing methodology at [facility] than at other [facility]. Direct result comparisons should only be made within the same method.          No orders to display         Procedure  Procedures     Ronel Yeager PA-C  03/04/25 3880

## 2025-03-05 ENCOUNTER — DOCUMENTATION (OUTPATIENT)
Dept: PRIMARY CARE | Facility: CLINIC | Age: 70
End: 2025-03-05
Payer: MEDICARE

## 2025-03-05 ENCOUNTER — APPOINTMENT (OUTPATIENT)
Dept: RADIOLOGY | Facility: CLINIC | Age: 70
End: 2025-03-05
Payer: MEDICARE

## 2025-03-05 NOTE — PROGRESS NOTES
Patient called stating he was seen in emergency room and placed on Xarelto, when he spoke with pharmacy he was quoted a price of $500 dollars, he then called wanting Dr. Joseph to [facility], I advised that Dr. Joseph can not prescribe as he has not seen him in over 5 years. I advised patient to contact ER to see if they would [facility] until he can see Dr. Joseph and re-establish care.

## 2025-03-06 ENCOUNTER — APPOINTMENT (OUTPATIENT)
Dept: ORTHOPEDIC SURGERY | Facility: CLINIC | Age: 70
End: 2025-03-06
Payer: MEDICARE

## 2025-03-13 ENCOUNTER — APPOINTMENT (OUTPATIENT)
Dept: ORTHOPEDIC SURGERY | Facility: CLINIC | Age: 70
End: 2025-03-13
Payer: MEDICARE

## 2025-03-13 ENCOUNTER — APPOINTMENT (OUTPATIENT)
Dept: PRIMARY CARE | Facility: CLINIC | Age: 70
End: 2025-03-13
Payer: MEDICARE

## 2025-03-13 VITALS
BODY MASS INDEX: 27.57 KG/M2 | TEMPERATURE: 97.5 F | DIASTOLIC BLOOD PRESSURE: 78 MMHG | HEART RATE: 67 BPM | SYSTOLIC BLOOD PRESSURE: 134 MMHG | WEIGHT: 208 LBS | HEIGHT: 73 IN

## 2025-03-13 DIAGNOSIS — Z12.5 PROSTATE CANCER SCREENING: ICD-10-CM

## 2025-03-13 DIAGNOSIS — I80.01 THROMBOPHLEBITIS OF RIGHT SAPHENOUS VEIN: ICD-10-CM

## 2025-03-13 DIAGNOSIS — Z12.11 COLON CANCER SCREENING: Primary | ICD-10-CM

## 2025-03-13 DIAGNOSIS — D64.9 ANEMIA, UNSPECIFIED TYPE: ICD-10-CM

## 2025-03-13 DIAGNOSIS — Z13.6 ENCOUNTER FOR SCREENING FOR CARDIOVASCULAR DISORDERS: ICD-10-CM

## 2025-03-13 PROCEDURE — 99203 OFFICE O/P NEW LOW 30 MIN: CPT | Performed by: INTERNAL MEDICINE

## 2025-03-13 PROCEDURE — 1160F RVW MEDS BY RX/DR IN RCRD: CPT | Performed by: INTERNAL MEDICINE

## 2025-03-13 PROCEDURE — 1159F MED LIST DOCD IN RCRD: CPT | Performed by: INTERNAL MEDICINE

## 2025-03-13 PROCEDURE — 3008F BODY MASS INDEX DOCD: CPT | Performed by: INTERNAL MEDICINE

## 2025-03-13 ASSESSMENT — PATIENT HEALTH QUESTIONNAIRE - PHQ9
2. FEELING DOWN, DEPRESSED OR HOPELESS: NOT AT ALL
SUM OF ALL RESPONSES TO PHQ9 QUESTIONS 1 AND 2: 0
1. LITTLE INTEREST OR PLEASURE IN DOING THINGS: NOT AT ALL

## 2025-03-13 ASSESSMENT — ENCOUNTER SYMPTOMS
GASTROINTESTINAL NEGATIVE: 1
ARTHRALGIAS: 1
DIZZINESS: 0
COLOR CHANGE: 0
OCCASIONAL FEELINGS OF UNSTEADINESS: 0
RESPIRATORY NEGATIVE: 1
DEPRESSION: 0
WEAKNESS: 0
LOSS OF SENSATION IN FEET: 0
CONSTITUTIONAL NEGATIVE: 1
CARDIOVASCULAR NEGATIVE: 1

## 2025-03-13 ASSESSMENT — COLUMBIA-SUICIDE SEVERITY RATING SCALE - C-SSRS
1. IN THE PAST MONTH, HAVE YOU WISHED YOU WERE DEAD OR WISHED YOU COULD GO TO SLEEP AND NOT WAKE UP?: NO
6. HAVE YOU EVER DONE ANYTHING, STARTED TO DO ANYTHING, OR PREPARED TO DO ANYTHING TO END YOUR LIFE?: NO
2. HAVE YOU ACTUALLY HAD ANY THOUGHTS OF KILLING YOURSELF?: NO

## 2025-03-13 NOTE — PROGRESS NOTES
Subjective   Patient ID: Andre Vera is a 69 y.o. male who presents for Establish Care (Re establishing care ).  HPI  DVT: Patient complains of  superficial thrombophlebitis  of theright leg(s). Symptoms have been present for 14 days He have not had similar problems in the past. The patient is able to ambulate. Risk factors for hypercoaguable state inlcude:  none known.  This patient was seen by me 5 years ago, there was a colonoscopy done in 2020 and at that time 3 tubular adenomas were identified and he was told to have a repeat colonoscopy in 6 months he never went because COVID came at that time and he never saw any doctor.  Recently was having ankle pain, seen by orthopedics, x-rays were negative, he was given a boot, then he developed a localized area of pain and inflammation, went to urgent care center, they said there is a saphenous vein thrombophlebitis, they prescribed patient on rivaroxaban, he was having hard time getting his the rivaroxaban because of the cost, finally he came year and finally he managed to have a prescription.  During this process I saw that he was anemic, rest of the laboratories were unremarkable.  Past Medical History  Past Medical History:   Diagnosis Date    Blood clot associated with vein wall inflammation        Social History  Social History     Tobacco Use    Smoking status: Former     Types: Cigars    Smokeless tobacco: Never   Vaping Use    Vaping status: Never Used   Substance Use Topics    Alcohol use: Not Currently    Drug use: Never       Family History   No family history on file.    Allergies:  No Known Allergies     Outpatient Medications:  Current Outpatient Medications   Medication Instructions    rivaroxaban (XARELTO) 10 mg, oral, Daily        Review of Systems   Constitutional: Negative.    Respiratory: Negative.     Cardiovascular: Negative.    Gastrointestinal: Negative.    Musculoskeletal:  Positive for arthralgias.   Skin:  Negative for color change.  "  Neurological:  Negative for dizziness and weakness.         Objective       Physical Exam  Vitals reviewed.   Constitutional:       Appearance: Normal appearance. He is normal weight.   HENT:      Head: Normocephalic.   Eyes:      Conjunctiva/sclera: Conjunctivae normal.   Cardiovascular:      Rate and Rhythm: Normal rate and regular rhythm.      Pulses: Normal pulses.   Pulmonary:      Effort: Pulmonary effort is normal.      Breath sounds: Normal breath sounds.   Abdominal:      Palpations: Abdomen is soft.   Musculoskeletal:         General: Normal range of motion.      Cervical back: Neck supple.   Skin:     General: Skin is warm and dry.   Neurological:      General: No focal deficit present.   Psychiatric:         Mood and Affect: Mood normal.     /78 (BP Location: Right arm, Patient Position: Sitting, BP Cuff Size: Adult)   Pulse 67   Temp 36.4 °C (97.5 °F) (Temporal)   Ht 1.854 m (6' 1\")   Wt 94.3 kg (208 lb)   BMI 27.44 kg/m²      Assessment/Plan   Problem List Items Addressed This Visit    None  Visit Diagnoses       Colon cancer screening    -  Primary    Relevant Orders    Colonoscopy Screening; Average Risk Patient    Thrombophlebitis of right saphenous vein        Prostate cancer screening        Relevant Orders    Prostate Specific Antigen, Screen    Anemia, unspecified type        Relevant Orders    CBC and Auto Differential    Ferritin    Encounter for screening for cardiovascular disorders        Relevant Orders    CT cardiac scoring wo IV contrast        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  It could have been precipitated by boot he was wearing, he will require colonoscopy sometimes in the future because of 5 years and " previous history of tubular adenomas.  He needs PSA, check CBC, check ferritin, he needs a coronary calcium score scan, all this test will be done, he is a former smoker, otherwise his physical exam is largely unremarkable, he was advised to have annual wellness check with me and also he will be seen back in 4 months test and investigations will be reviewed and he will be notified appropriately, there is no evidence of any localized pain or discomfort at the site of thrombophlebitis.  Does not have any limping, does not have any other findings on physical exam.

## 2025-03-14 DIAGNOSIS — D64.9 ANEMIA, UNSPECIFIED TYPE: Primary | ICD-10-CM

## 2025-03-14 LAB
BASOPHILS # BLD AUTO: 61 CELLS/UL (ref 0–200)
BASOPHILS NFR BLD AUTO: 0.9 %
EOSINOPHIL # BLD AUTO: 190 CELLS/UL (ref 15–500)
EOSINOPHIL NFR BLD AUTO: 2.8 %
ERYTHROCYTE [DISTWIDTH] IN BLOOD BY AUTOMATED COUNT: 12.9 % (ref 11–15)
FERRITIN SERPL-MCNC: 474 NG/ML (ref 24–380)
HCT VFR BLD AUTO: 33.3 % (ref 38.5–50)
HGB BLD-MCNC: 11.5 G/DL (ref 13.2–17.1)
LYMPHOCYTES # BLD AUTO: 1992 CELLS/UL (ref 850–3900)
LYMPHOCYTES NFR BLD AUTO: 29.3 %
MCH RBC QN AUTO: 32.1 PG (ref 27–33)
MCHC RBC AUTO-ENTMCNC: 34.5 G/DL (ref 32–36)
MCV RBC AUTO: 93 FL (ref 80–100)
MONOCYTES # BLD AUTO: 476 CELLS/UL (ref 200–950)
MONOCYTES NFR BLD AUTO: 7 %
NEUTROPHILS # BLD AUTO: 4080 CELLS/UL (ref 1500–7800)
NEUTROPHILS NFR BLD AUTO: 60 %
PLATELET # BLD AUTO: 246 THOUSAND/UL (ref 140–400)
PMV BLD REES-ECKER: 10.1 FL (ref 7.5–12.5)
PSA SERPL-MCNC: 2.67 NG/ML
RBC # BLD AUTO: 3.58 MILLION/UL (ref 4.2–5.8)
WBC # BLD AUTO: 6.8 THOUSAND/UL (ref 3.8–10.8)

## 2025-03-20 ENCOUNTER — APPOINTMENT (OUTPATIENT)
Dept: ORTHOPEDIC SURGERY | Facility: CLINIC | Age: 70
End: 2025-03-20
Payer: MEDICARE

## 2025-07-15 ENCOUNTER — APPOINTMENT (OUTPATIENT)
Dept: PRIMARY CARE | Facility: CLINIC | Age: 70
End: 2025-07-15
Payer: MEDICARE